# Patient Record
Sex: MALE | Race: AMERICAN INDIAN OR ALASKA NATIVE
[De-identification: names, ages, dates, MRNs, and addresses within clinical notes are randomized per-mention and may not be internally consistent; named-entity substitution may affect disease eponyms.]

---

## 2018-08-08 ENCOUNTER — HOSPITAL ENCOUNTER (EMERGENCY)
Dept: HOSPITAL 42 - ED | Age: 23
LOS: 1 days | Discharge: HOME | End: 2018-08-09
Payer: COMMERCIAL

## 2018-08-08 VITALS — TEMPERATURE: 97.9 F

## 2018-08-08 VITALS — BODY MASS INDEX: 24.5 KG/M2

## 2018-08-08 DIAGNOSIS — H10.9: ICD-10-CM
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
## 2018-08-08 DIAGNOSIS — S05.01XA: Primary | ICD-10-CM

## 2018-08-08 DIAGNOSIS — F17.210: ICD-10-CM

## 2018-08-08 NOTE — ED PDOC
Arrival/HPI





- General


Historian: Patient





<Martha Nicole PA-C - Last Filed: 08/08/18 23:41>





<Tim Manning - Last Filed: 08/09/18 00:01>





- General


Time Seen by Provider: 08/08/18 23:26





- History of Present Illness


Narrative History of Present Illness (Text): 





08/08/18 23:45


22 yo M c/o b/l eye pain, redness with d/c x2 days, R eye > L eye. Patient 

states that he wears contact lens. He believes that FB may have flown into his 

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 Denies any 

decrease in vision, headache, fever, URI.  (Martha Nicole PA-C)





Past Medical History





- Infectious Disease


Hx of Infectious Diseases: None





- Past Medical History


Past Medical History: No Previous





- Psychiatric


Hx Depression: No


Hx Substance Use: No





- Past Surgical History


Past Surgical History: No Previous





- Suicidal Assessment


Feels Threatened In Home Enviroment: No





<Martha Nicole PA-C - Last Filed: 08/08/18 23:41>





Family/Social History


Family/Social History: No Known Family HX


Smoking Status: Light Smoker < 10 Cigarettes Daily


Hx Alcohol Use: Yes


Hx Substance Use: No


Hx Substance Use Treatment: No





<Martha Nicole PA-C - Last Filed: 08/08/18 23:41>





Allergies/Home Meds





<Martha Nicole PA-C - Last Filed: 08/08/18 23:41>





<Tim Manning - Last Filed: 08/09/18 00:01>


Allergies/Adverse Reactions: 


Allergies





shrimp Allergy (Verified 08/08/18 23:25)


 RASH











Review of Systems





- Review of Systems


Constitutional: absent: Fatigue, Fevers


Eyes: Eye Pain.  absent: Vision Changes


ENT: absent: Sore Throat, Rhinorrhea


Respiratory: absent: Cough, Sputum


Skin: absent: Rash, Pruritis, Skin Lesions


Neurological: absent: Headache, Dizziness





<Martha Nicole PA-C - Last Filed: 08/08/18 23:41>





Physical Exam


Temperature: Afebrile


Blood Pressure: Hypertensive


Pulse: Regular


Respiratory Rate: Normal


Appearance: Positive for: Well-Appearing, Non-Toxic, Comfortable


Pain Distress: None


Mental Status: Positive for: Alert and Oriented X 3





- Systems Exam


Head: Present: Atraumatic, Normocephalic


Pupils: Present: PERRL


Extroacular Muscles: Present: EOMI


Conjunctiva: Present: Injected, Other (+corneal abrasion at 12 o'clock of the R 

eye, no FB noted to each eye and no FB noted under the eyelids of both eyes)


Mouth: Present: Moist Mucous Membranes


Neck: Present: Normal Range of Motion


Upper Extremity: Present: Normal Inspection.  No: Edema


Neurological: Present: GCS=15, CN II-XII Intact, Motor Func Grossly Intact, 

Normal Sensory Function


Skin: Present: Warm, Dry


Psychiatric: Present: Alert, Oriented x 3





<Martha Nicole PA-C - Last Filed: 08/08/18 23:41>





Vital Signs











  Temp Pulse Resp BP Pulse Ox


 


 08/08/18 23:25  97.9 F  64  18  156/96 H  99














Medical Decision Making





<Martha Nicole PA-C - Last Filed: 08/08/18 23:41>





<Tim Manning - Last Filed: 08/09/18 00:01>


ED Course and Treatment: 





08/08/18 23:43


Plan : 


- visual acuity


- ciloxin ophthalmic 


- motrin po








Advised to follow up with eye doctor in 1-2 days without fail. Advised to take 

medication as prescribed. Return to the emergency room at any time for any new 

or worsening symptoms.





Patient states he fully agrees with and understands discharge instructions. 

States that he agrees with the plan and disposition. Verbalized and repeated 

discharge instructions and plan. I have given the patient opportunity to ask 

any additional questions. (Martha Nicole PA-C)





- Medication Orders


Current Medication Orders: 














Discontinued Medications





Ciprofloxacin (Ciloxan 0.3% Ophth Soln)  1 drop OU STAT STA


   Stop: 08/08/18 23:49


Ibuprofen (Motrin Tab)  600 mg PO STAT STA


   Stop: 08/08/18 23:49











- PA / NP / Resident Statement


MD/DO has reviewed & agrees with the documentation as recorded.





<Martha Nicole PA-C - Last Filed: 08/08/18 23:41>





- PA / NP / Resident Statement


MD/ has reviewed & agrees with the documentation as recorded.





<Tim Manning - Last Filed: 08/09/18 00:01>





Disposition/Present on Arrival





- Present on Arrival


Any Indicators Present on Arrival: No


History of DVT/PE: No


History of Uncontrolled Diabetes: No


Urinary Catheter: No


History of Decub. Ulcer: No


History Surgical Site Infection Following: None





- Disposition


Have Diagnosis and Disposition been Completed?: Yes


Disposition Time: 23:45


Patient Plan: Discharge





<Martha Nicole PA-C - Last Filed: 08/08/18 23:41>





<KerriTim - Last Filed: 08/09/18 00:01>





- Disposition


Diagnosis: 


 Corneal abrasion, right, Conjunctivitis





Disposition: HOME/ ROUTINE


Condition: STABLE


Discharge Instructions (ExitCare):  Conjunctivitis (Pinkeye) (DC), Corneal 

Abrasion (DC)


Additional Instructions: 


Thank you for letting us take care of you today. You were treated for corneal 

abrasion R eye, conjunctivitis. The emergency medical care you received today 

was directed at your acute symptoms. If you were prescribed any medication, 

please fill it and take as directed. It may take several days for your symptoms 

to resolve. Return to the Emergency Department if your symptoms worsen, do not 

improve, or if you have any other problems.





Please call one of the physicians/clinics you have been referred to that are 

listed on the Patient Visit Information form that is included in your discharge 

packet. Bring any paperwork you were given at discharge with you along with any 

medications you are taking to your follow up visit. Our treatment cannot 

replace ongoing medical care by a primary care provider (PCP) outside of the 

emergency department.





Thank you for allowing the [facility] team to be part of your care today.





Prescriptions: 


Ketorolac Tromethamine [Acular 0.5%] 1 drop EACHEYE QID #1 bottle


Ciprofloxacin 0.3% [Ciloxan 0.3% Ophth SOLN] 1 drop EACHEYE QID #1 bottle


Ibuprofen [Motrin Tab] 600 mg PO QID PRN #20 tab


 PRN Reason: Pain, Moderate (4-7)


Referrals: 


Geraldo Cazares [Staff Provider] - Follow up with primary


Forms:  WORK NOTE

## 2018-08-09 VITALS
OXYGEN SATURATION: 100 % | HEART RATE: 60 BPM | DIASTOLIC BLOOD PRESSURE: 92 MMHG | RESPIRATION RATE: 17 BRPM | SYSTOLIC BLOOD PRESSURE: 140 MMHG

## 2018-08-09 RX ADMIN — CIPROFLOXACIN STA DROP: 3 SOLUTION/ DROPS OPHTHALMIC; TOPICAL at 00:09
